# Patient Record
Sex: MALE | Race: WHITE | NOT HISPANIC OR LATINO | Employment: OTHER | URBAN - METROPOLITAN AREA
[De-identification: names, ages, dates, MRNs, and addresses within clinical notes are randomized per-mention and may not be internally consistent; named-entity substitution may affect disease eponyms.]

---

## 2017-01-20 NOTE — PATIENT DISCUSSION
DRY EYE SYNDROME, OU: PRESCRIBED ARTIFICIAL GEL TEARS BID - QID, OU AND REFRESH PM QHS OU. RETURN FOR FOLLOW-UP AS SCHEDULED OR SOONER IF SYMPTOMS WORSEN.

## 2017-01-20 NOTE — PATIENT DISCUSSION
POAG, OU Counseling: I have reviewed the regimen of glaucoma drops with the patient and have stressed the importance of compliance. Patient instructed to continue present medication and return for follow-up as scheduled.

## 2017-01-20 NOTE — PATIENT DISCUSSION
POAG, OU: INTRAOCULAR PRESSURE IS WITHIN ACCEPTABLE LIMITS. PT INSTRUCTED TO CONTINUE LATANOPROST QHS OU AND RETURN FOR FOLLOW-UP AS SCHEDULED.

## 2017-01-20 NOTE — PATIENT DISCUSSION
Diabetes without Retinopathy Counseling :  I have discussed with the patient the importance of controlling blood glucose, blood pressure and lipid levels levels  to minimize the risk of developing retinal disease from diabetes. Explained the importance of annual dilated eye exams because effective treatment for diabetic retinopathy depends on timely intervention. The patient was instructed to call or return sooner if they noticed blurred or distorted vision, fluctuating vision, pain or redness around one or both eyes. Return for follow-up as scheduled.

## 2019-01-10 NOTE — PATIENT DISCUSSION
DERMATOCHALASIS / PTOSIS RUL AND AURELIO :  VISUALLY SIGNIFICANT TO PATIENT. SCHEDULE WITH OCULOPLASTIC SPECIALIST IF PATIENT DESIRES.

## 2020-02-19 ENCOUNTER — NEW PATIENT COMPREHENSIVE (OUTPATIENT)
Dept: URBAN - METROPOLITAN AREA CLINIC 36 | Facility: CLINIC | Age: 80
End: 2020-02-19

## 2020-02-19 DIAGNOSIS — H53.9: ICD-10-CM

## 2020-02-19 PROCEDURE — 92015 DETERMINE REFRACTIVE STATE: CPT

## 2020-02-19 PROCEDURE — 92004 COMPRE OPH EXAM NEW PT 1/>: CPT

## 2020-02-19 PROCEDURE — 92134 CPTRZ OPH DX IMG PST SGM RTA: CPT

## 2020-02-19 ASSESSMENT — VISUAL ACUITY
OS_CC: 20/70+2
OS_SC: J8
OD_SC: 20/70
OS_SC: 20/70
OD_CC: 20/70+2
OD_SC: J8

## 2020-02-19 ASSESSMENT — TONOMETRY
OS_IOP_MMHG: 18
OD_IOP_MMHG: 19

## 2020-03-11 ENCOUNTER — FOLLOW UP (OUTPATIENT)
Dept: URBAN - METROPOLITAN AREA CLINIC 36 | Facility: CLINIC | Age: 80
End: 2020-03-11

## 2020-03-11 DIAGNOSIS — H52.7: ICD-10-CM

## 2020-03-11 DIAGNOSIS — H53.9: ICD-10-CM

## 2020-03-11 PROCEDURE — 92012 INTRM OPH EXAM EST PATIENT: CPT

## 2020-03-11 PROCEDURE — 92015 DETERMINE REFRACTIVE STATE: CPT

## 2020-03-11 ASSESSMENT — VISUAL ACUITY
OD_CC: 20/60-1
OS_CC: 20/60-1

## 2020-03-11 ASSESSMENT — TONOMETRY
OS_IOP_MMHG: 18
OD_IOP_MMHG: 17

## 2020-12-01 NOTE — PATIENT DISCUSSION
POAG, OU: STABLE DEFECT OS. PT INSTRUCTED TO CONTINUE LATANOPROST QHS OU AND RETURN FOR FOLLOW-UP AS SCHEDULED.

## 2021-09-23 NOTE — PATIENT DISCUSSION
*POAG, OU: INTRAOCULAR PRESSURE IS WITHIN ACCEPTABLE LIMITS. THINNING WITH PROGRESSION NOTICED ON OPTIC NERVE OCT OU TODAY. PT STATES THAT HE IS SCHEDULED FOR [facility] IN [facility]TH. AGREE WITH THIS DECISION. STRESSED COMPLIANCE WITH CONSISTENT DROP USE. PT INSTRUCTED TO CONTINUE WITH DROPS AS PRESCRIBED AND RETURN FOR FOLLOW-UP AS SCHEDULED.

## 2021-12-13 NOTE — PATIENT DISCUSSION
HVF today new superior defect OD (not consistent with good IOP control and stable OCT) - more likely eyelid related. Stable defect OS. Repeat OD only in 3-4 months with eyelid taped. No changes in treatment indicated at this time. Continue latanoprost qhs OU. Follow-up as directed. Monitor.
See plan 1.
Detail Level: Generalized
Detail Level: Simple
Detail Level: Zone

## 2022-03-21 NOTE — PATIENT DISCUSSION
HVF today new superior defect OD (not consistent with good IOP control and stable OCT) - more likely eyelid related. Stable defect OS. Repeat OD only in 3-4 months with eyelid taped. No changes in treatment indicated at this time. Continue latanoprost qhs OU. Follow-up as directed. Monitor.

## 2023-01-09 NOTE — PATIENT DISCUSSION
HVF 24-2 today non-specific points/changes OD, stable defect OS. No changes in treatment indicated at this time. Continue latanoprost qhs OU. Follow-up as directed. Monitor.
